# Patient Record
Sex: FEMALE | Race: WHITE | URBAN - METROPOLITAN AREA
[De-identification: names, ages, dates, MRNs, and addresses within clinical notes are randomized per-mention and may not be internally consistent; named-entity substitution may affect disease eponyms.]

---

## 2024-09-30 ENCOUNTER — APPOINTMENT (RX ONLY)
Dept: URBAN - METROPOLITAN AREA CLINIC 28 | Facility: CLINIC | Age: 74
Setting detail: DERMATOLOGY
End: 2024-09-30

## 2024-09-30 DIAGNOSIS — Z12.83 ENCOUNTER FOR SCREENING FOR MALIGNANT NEOPLASM OF SKIN: ICD-10-CM

## 2024-09-30 DIAGNOSIS — L57.8 OTHER SKIN CHANGES DUE TO CHRONIC EXPOSURE TO NONIONIZING RADIATION: ICD-10-CM

## 2024-09-30 DIAGNOSIS — D22 MELANOCYTIC NEVI: ICD-10-CM

## 2024-09-30 DIAGNOSIS — Z85.828 PERSONAL HISTORY OF OTHER MALIGNANT NEOPLASM OF SKIN: ICD-10-CM

## 2024-09-30 DIAGNOSIS — L91.8 OTHER HYPERTROPHIC DISORDERS OF THE SKIN: ICD-10-CM

## 2024-09-30 DIAGNOSIS — L82.1 OTHER SEBORRHEIC KERATOSIS: ICD-10-CM

## 2024-09-30 DIAGNOSIS — I83.9 ASYMPTOMATIC VARICOSE VEINS OF LOWER EXTREMITIES: ICD-10-CM

## 2024-09-30 PROBLEM — I83.92 ASYMPTOMATIC VARICOSE VEINS OF LEFT LOWER EXTREMITY: Status: ACTIVE | Noted: 2024-09-30

## 2024-09-30 PROBLEM — D22.5 MELANOCYTIC NEVI OF TRUNK: Status: ACTIVE | Noted: 2024-09-30

## 2024-09-30 PROCEDURE — 99213 OFFICE O/P EST LOW 20 MIN: CPT

## 2024-09-30 PROCEDURE — ? PRESCRIPTION

## 2024-09-30 PROCEDURE — ? PRESCRIPTION MEDICATION MANAGEMENT

## 2024-09-30 PROCEDURE — ? COUNSELING

## 2024-09-30 RX ORDER — TRETIONIN 1 MG/G
CREAM TOPICAL QHS
Qty: 45 | Refills: 3 | Status: CANCELLED
Stop reason: CLARIF

## 2024-09-30 ASSESSMENT — LOCATION SIMPLE DESCRIPTION DERM
LOCATION SIMPLE: LEFT KNEE
LOCATION SIMPLE: LEFT UPPER BACK
LOCATION SIMPLE: RIGHT CHEEK
LOCATION SIMPLE: LEFT CHEEK
LOCATION SIMPLE: ABDOMEN

## 2024-09-30 ASSESSMENT — LOCATION DETAILED DESCRIPTION DERM
LOCATION DETAILED: LEFT KNEE
LOCATION DETAILED: LEFT INFERIOR MEDIAL UPPER BACK
LOCATION DETAILED: SUBXIPHOID
LOCATION DETAILED: LEFT INFERIOR CENTRAL MALAR CHEEK
LOCATION DETAILED: EPIGASTRIC SKIN
LOCATION DETAILED: RIGHT INFERIOR CENTRAL MALAR CHEEK

## 2024-09-30 ASSESSMENT — LOCATION ZONE DERM
LOCATION ZONE: TRUNK
LOCATION ZONE: LEG
LOCATION ZONE: FACE

## 2024-09-30 NOTE — PROCEDURE: COUNSELING
Detail Level: Generalized
Skin Checks Recommendations: SSE q month\\nFBSE with derm q year
Patient Specific Counseling (Will Not Stick From Patient To Patient): -\\n\\nDiscussed it can cause some peeling and dryness since patient has been off it for a while. Recommended to start off with every other night and then increase as tolerated. Very good moisturizer like ceravae cream or cetaphil cream daily.
Detail Level: Detailed
Patient Specific Counseling (Will Not Stick From Patient To Patient): --the patient reports this is old and unchanged
Patient Specific Counseling (Will Not Stick From Patient To Patient): -\\nOkay to leave alone for now.
Patient Specific Counseling (Will Not Stick From Patient To Patient): -\\n\\nDiscussed those are common, looks okay

## 2024-09-30 NOTE — HPI: OTHER
Condition:: Medication refill
Please Describe Your Condition:: Patient is needing a refill for the tretinoin cream 0.1%. She was using is every third day but hasn’t used it recently and is wanting a paper copy of it.
Condition:: Leg spider veins
Please Describe Your Condition:: The patient asks what the small red lines are on the skin of her legs, getting this slowly as she gets older. No symptoms.

## 2024-09-30 NOTE — PROCEDURE: PRESCRIPTION MEDICATION MANAGEMENT
Render In Strict Bullet Format?: No
Detail Level: Zone
Continue Regimen: tretinoin 0.1 % topical cream qhs\\nQuantity: 45.0 g  Days Supply: 30\\nSig: Apply a pea size amount to face QOHS to QHS